# Patient Record
Sex: FEMALE | Race: WHITE | URBAN - METROPOLITAN AREA
[De-identification: names, ages, dates, MRNs, and addresses within clinical notes are randomized per-mention and may not be internally consistent; named-entity substitution may affect disease eponyms.]

---

## 2020-06-19 ENCOUNTER — IMPORTED ENCOUNTER (OUTPATIENT)
Dept: URBAN - METROPOLITAN AREA CLINIC 50 | Facility: CLINIC | Age: 65
End: 2020-06-19

## 2020-06-22 ENCOUNTER — IMPORTED ENCOUNTER (OUTPATIENT)
Dept: URBAN - METROPOLITAN AREA CLINIC 50 | Facility: CLINIC | Age: 65
End: 2020-06-22

## 2020-06-23 ENCOUNTER — IMPORTED ENCOUNTER (OUTPATIENT)
Dept: URBAN - METROPOLITAN AREA CLINIC 50 | Facility: CLINIC | Age: 65
End: 2020-06-23

## 2020-08-18 ENCOUNTER — IMPORTED ENCOUNTER (OUTPATIENT)
Dept: URBAN - METROPOLITAN AREA CLINIC 50 | Facility: CLINIC | Age: 65
End: 2020-08-18

## 2020-08-31 ENCOUNTER — IMPORTED ENCOUNTER (OUTPATIENT)
Dept: URBAN - METROPOLITAN AREA CLINIC 50 | Facility: CLINIC | Age: 65
End: 2020-08-31

## 2020-09-08 ENCOUNTER — IMPORTED ENCOUNTER (OUTPATIENT)
Dept: URBAN - METROPOLITAN AREA CLINIC 50 | Facility: CLINIC | Age: 65
End: 2020-09-08

## 2020-09-08 NOTE — PATIENT DISCUSSION
"""S/P IOL OD: Tecnis [id number] 23.00 @ 55Âº (Target: Distance) +Femto/Arcs +Omidria.  Continue post operative instructions and drops per schedule. "" ""Increase Pred-Moxi-Nepaf right eye three times a day

## 2020-09-14 ENCOUNTER — IMPORTED ENCOUNTER (OUTPATIENT)
Dept: URBAN - METROPOLITAN AREA CLINIC 50 | Facility: CLINIC | Age: 65
End: 2020-09-14

## 2020-09-14 NOTE — PATIENT DISCUSSION
"""S/P IOL OD: Tecnis [id number] 23.00 @ 55Âº (Target: Distance) +Femto/Arcs +Omidria.  Continue post operative instructions and drops per schedule. "" ""Continue Pred-Moxi-Nepaf right eye three times a day

## 2020-09-22 ENCOUNTER — IMPORTED ENCOUNTER (OUTPATIENT)
Dept: URBAN - METROPOLITAN AREA CLINIC 50 | Facility: CLINIC | Age: 65
End: 2020-09-22

## 2020-09-22 NOTE — PATIENT DISCUSSION
"""S/P IOL OS: Tecnis [id number] 23 (Target: Distance) +Femto/Arcs +TM/Omidria.  Continue post operative instructions and drops per schedule. "" ""Continue Pred-Moxi-Nepaf left eye three times a day

## 2020-09-28 ENCOUNTER — IMPORTED ENCOUNTER (OUTPATIENT)
Dept: URBAN - METROPOLITAN AREA CLINIC 50 | Facility: CLINIC | Age: 65
End: 2020-09-28

## 2020-09-28 NOTE — PATIENT DISCUSSION
"""S/P IOL OS: Tecnis ZCB00 23 (Target: Distance) +Femto/Arcs +TM/Omidria. Continue post operative instructions and drops per schedule.  """

## 2020-10-27 ENCOUNTER — IMPORTED ENCOUNTER (OUTPATIENT)
Dept: URBAN - METROPOLITAN AREA CLINIC 50 | Facility: CLINIC | Age: 65
End: 2020-10-27

## 2020-10-27 NOTE — PATIENT DISCUSSION
"""S/P IOL OU: OD: Tecnis [id number] 23.00 @ 55Âº (Target: Distance)Femto/ArcsOmidria. OS: Tecnis [id number] 23 (Target: Distance)/Arcs +TM. "

## 2021-04-17 ASSESSMENT — VISUAL ACUITY
OD_PH: 20/40
OD_SC: 20/40
OS_BAT: 20/40
OD_SC: 20/80-1
OD_SC: 20/60
OS_BAT: 20/40
OD_SC: 20/40
OS_OTHER: 20/40. 20/40.
OS_CC: J1+
OS_SC: 20/25
OD_CC: J1+
OD_CC: J2
OS_SC: 20/60
OS_CC: J2
OD_BAT: 20/40
OS_SC: 20/40-1
OD_BAT: 20/40
OD_PH: 20/25-
OD_OTHER: 20/40. 20/60.
OD_OTHER: 20/40. 20/60.
OD_PH: 20/30
OS_CC: J2
OD_CC: J2
OS_PH: 20/40
OD_PH: 20/25
OD_BAT: 20/40
OS_SC: 20/25
OS_PH: 20/25-
OS_OTHER: 20/40. 20/60.
OD_SC: 20/60
OS_CC: 20/50
OD_OTHER: 20/40. 20/60.
OS_SC: 20/50-1
OS_OTHER: 20/40. 20/40.
OS_BAT: 20/40
OD_SC: 20/50

## 2021-04-17 ASSESSMENT — TONOMETRY
OS_IOP_MMHG: 20
OD_IOP_MMHG: 15
OD_IOP_MMHG: 14
OS_IOP_MMHG: 16
OD_IOP_MMHG: 16
OS_IOP_MMHG: 15
OD_IOP_MMHG: 13
OD_IOP_MMHG: 11
OD_IOP_MMHG: 12
OS_IOP_MMHG: 18
OD_IOP_MMHG: 25
OS_IOP_MMHG: 13
OS_IOP_MMHG: 16
OS_IOP_MMHG: 14

## 2023-08-16 ENCOUNTER — APPOINTMENT (RX ONLY)
Dept: URBAN - METROPOLITAN AREA CLINIC 58 | Facility: CLINIC | Age: 68
Setting detail: DERMATOLOGY
End: 2023-08-16

## 2023-08-16 DIAGNOSIS — L82.1 OTHER SEBORRHEIC KERATOSIS: ICD-10-CM

## 2023-08-16 DIAGNOSIS — L81.4 OTHER MELANIN HYPERPIGMENTATION: ICD-10-CM

## 2023-08-16 DIAGNOSIS — D18.0 HEMANGIOMA: ICD-10-CM

## 2023-08-16 DIAGNOSIS — D22 MELANOCYTIC NEVI: ICD-10-CM

## 2023-08-16 PROBLEM — D22.5 MELANOCYTIC NEVI OF TRUNK: Status: ACTIVE | Noted: 2023-08-16

## 2023-08-16 PROBLEM — D48.5 NEOPLASM OF UNCERTAIN BEHAVIOR OF SKIN: Status: ACTIVE | Noted: 2023-08-16

## 2023-08-16 PROBLEM — D18.01 HEMANGIOMA OF SKIN AND SUBCUTANEOUS TISSUE: Status: ACTIVE | Noted: 2023-08-16

## 2023-08-16 PROCEDURE — ? COUNSELING

## 2023-08-16 PROCEDURE — ? FULL BODY SKIN EXAM

## 2023-08-16 PROCEDURE — ? TREATMENT REGIMEN

## 2023-08-16 PROCEDURE — ? BIOPSY BY SHAVE METHOD

## 2023-08-16 PROCEDURE — 99203 OFFICE O/P NEW LOW 30 MIN: CPT | Mod: 25

## 2023-08-16 PROCEDURE — 11102 TANGNTL BX SKIN SINGLE LES: CPT

## 2023-08-16 ASSESSMENT — LOCATION SIMPLE DESCRIPTION DERM
LOCATION SIMPLE: RIGHT FOREHEAD
LOCATION SIMPLE: ABDOMEN

## 2023-08-16 ASSESSMENT — LOCATION ZONE DERM
LOCATION ZONE: FACE
LOCATION ZONE: TRUNK

## 2023-08-16 ASSESSMENT — LOCATION DETAILED DESCRIPTION DERM
LOCATION DETAILED: PERIUMBILICAL SKIN
LOCATION DETAILED: RIGHT FOREHEAD
LOCATION DETAILED: EPIGASTRIC SKIN